# Patient Record
Sex: MALE | Race: WHITE | URBAN - METROPOLITAN AREA
[De-identification: names, ages, dates, MRNs, and addresses within clinical notes are randomized per-mention and may not be internally consistent; named-entity substitution may affect disease eponyms.]

---

## 2017-02-25 ENCOUNTER — HOSPITAL ENCOUNTER (EMERGENCY)
Facility: CLINIC | Age: 42
Discharge: HOME | End: 2017-02-25
Attending: FAMILY MEDICINE | Admitting: FAMILY MEDICINE
Payer: COMMERCIAL

## 2017-02-25 ENCOUNTER — APPOINTMENT (OUTPATIENT)
Dept: GENERAL RADIOLOGY | Facility: CLINIC | Age: 42
End: 2017-02-25
Attending: FAMILY MEDICINE
Payer: COMMERCIAL

## 2017-02-25 ENCOUNTER — TELEPHONE (OUTPATIENT)
Dept: OTHER | Facility: CLINIC | Age: 42
End: 2017-02-25

## 2017-02-25 VITALS
WEIGHT: 160 LBS | RESPIRATION RATE: 16 BRPM | OXYGEN SATURATION: 98 % | TEMPERATURE: 97.8 F | SYSTOLIC BLOOD PRESSURE: 133 MMHG | DIASTOLIC BLOOD PRESSURE: 98 MMHG | HEIGHT: 68 IN | HEART RATE: 92 BPM | BODY MASS INDEX: 24.25 KG/M2

## 2017-02-25 DIAGNOSIS — M02.39 REACTIVE ARTHRITIS OF MULTIPLE SITES (H): ICD-10-CM

## 2017-02-25 DIAGNOSIS — M02.30 REACTIVE INFLAMMATORY ARTHRITIS (H): ICD-10-CM

## 2017-02-25 LAB
ALBUMIN SERPL-MCNC: 3.7 G/DL (ref 3.4–5)
ALBUMIN UR-MCNC: NEGATIVE MG/DL
ALP SERPL-CCNC: 106 U/L (ref 40–150)
ALT SERPL W P-5'-P-CCNC: 22 U/L (ref 0–70)
ANION GAP SERPL CALCULATED.3IONS-SCNC: 10 MMOL/L (ref 3–14)
APPEARANCE UR: CLEAR
AST SERPL W P-5'-P-CCNC: 12 U/L (ref 0–45)
BASOPHILS # BLD AUTO: 0.1 10E9/L (ref 0–0.2)
BASOPHILS NFR BLD AUTO: 0.9 %
BILIRUB SERPL-MCNC: 0.5 MG/DL (ref 0.2–1.3)
BILIRUB UR QL STRIP: NEGATIVE
BUN SERPL-MCNC: 8 MG/DL (ref 7–30)
CALCIUM SERPL-MCNC: 9.3 MG/DL (ref 8.5–10.1)
CHLORIDE SERPL-SCNC: 106 MMOL/L (ref 94–109)
CO2 SERPL-SCNC: 24 MMOL/L (ref 20–32)
COLOR UR AUTO: NORMAL
CREAT SERPL-MCNC: 0.86 MG/DL (ref 0.66–1.25)
CRP SERPL-MCNC: 73 MG/L (ref 0–8)
DEPRECATED S PYO AG THROAT QL EIA: NORMAL
DIFFERENTIAL METHOD BLD: ABNORMAL
EOSINOPHIL # BLD AUTO: 0.1 10E9/L (ref 0–0.7)
EOSINOPHIL NFR BLD AUTO: 0.9 %
ERYTHROCYTE [DISTWIDTH] IN BLOOD BY AUTOMATED COUNT: 13.7 % (ref 10–15)
ERYTHROCYTE [SEDIMENTATION RATE] IN BLOOD BY WESTERGREN METHOD: 22 MM/H (ref 0–15)
FLUAV+FLUBV AG SPEC QL: NEGATIVE
FLUAV+FLUBV AG SPEC QL: NORMAL
GFR SERPL CREATININE-BSD FRML MDRD: ABNORMAL ML/MIN/1.7M2
GLUCOSE SERPL-MCNC: 112 MG/DL (ref 70–99)
GLUCOSE UR STRIP-MCNC: NEGATIVE MG/DL
HCT VFR BLD AUTO: 43.6 % (ref 40–53)
HGB BLD-MCNC: 14.8 G/DL (ref 13.3–17.7)
HGB UR QL STRIP: NEGATIVE
KETONES UR STRIP-MCNC: NEGATIVE MG/DL
LEUKOCYTE ESTERASE UR QL STRIP: NEGATIVE
LYMPHOCYTES # BLD AUTO: 2.8 10E9/L (ref 0.8–5.3)
LYMPHOCYTES NFR BLD AUTO: 23 %
MCH RBC QN AUTO: 29.4 PG (ref 26.5–33)
MCHC RBC AUTO-ENTMCNC: 33.9 G/DL (ref 31.5–36.5)
MCV RBC AUTO: 87 FL (ref 78–100)
MICRO REPORT STATUS: NORMAL
MONOCYTES # BLD AUTO: 0.9 10E9/L (ref 0–1.3)
MONOCYTES NFR BLD AUTO: 7.1 %
NEUTROPHILS # BLD AUTO: 8.4 10E9/L (ref 1.6–8.3)
NEUTROPHILS NFR BLD AUTO: 68.1 %
NITRATE UR QL: NEGATIVE
PH UR STRIP: 5.5 PH (ref 5–7)
PLATELET # BLD AUTO: 363 10E9/L (ref 150–450)
PLATELET # BLD EST: ABNORMAL 10*3/UL
POTASSIUM SERPL-SCNC: 3.6 MMOL/L (ref 3.4–5.3)
PROT SERPL-MCNC: 8.1 G/DL (ref 6.8–8.8)
RBC # BLD AUTO: 5.03 10E12/L (ref 4.4–5.9)
RBC MORPH BLD: NORMAL
SODIUM SERPL-SCNC: 140 MMOL/L (ref 133–144)
SP GR UR STRIP: 1.01 (ref 1–1.03)
SPECIMEN SOURCE: NORMAL
SPECIMEN SOURCE: NORMAL
URN SPEC COLLECT METH UR: NORMAL
UROBILINOGEN UR STRIP-MCNC: NORMAL MG/DL (ref 0–2)
WBC # BLD AUTO: 12.3 10E9/L (ref 4–11)

## 2017-02-25 PROCEDURE — 80053 COMPREHEN METABOLIC PANEL: CPT | Performed by: FAMILY MEDICINE

## 2017-02-25 PROCEDURE — 99285 EMERGENCY DEPT VISIT HI MDM: CPT | Mod: Z6 | Performed by: FAMILY MEDICINE

## 2017-02-25 PROCEDURE — 87081 CULTURE SCREEN ONLY: CPT | Performed by: FAMILY MEDICINE

## 2017-02-25 PROCEDURE — 25000128 H RX IP 250 OP 636: Performed by: FAMILY MEDICINE

## 2017-02-25 PROCEDURE — 25000132 ZZH RX MED GY IP 250 OP 250 PS 637: Performed by: FAMILY MEDICINE

## 2017-02-25 PROCEDURE — 87040 BLOOD CULTURE FOR BACTERIA: CPT | Performed by: FAMILY MEDICINE

## 2017-02-25 PROCEDURE — 87536 HIV-1 QUANT&REVRSE TRNSCRPJ: CPT | Performed by: FAMILY MEDICINE

## 2017-02-25 PROCEDURE — 99285 EMERGENCY DEPT VISIT HI MDM: CPT | Mod: 25

## 2017-02-25 PROCEDURE — 86780 TREPONEMA PALLIDUM: CPT | Mod: 91 | Performed by: FAMILY MEDICINE

## 2017-02-25 PROCEDURE — 87880 STREP A ASSAY W/OPTIC: CPT | Performed by: FAMILY MEDICINE

## 2017-02-25 PROCEDURE — 85652 RBC SED RATE AUTOMATED: CPT | Performed by: FAMILY MEDICINE

## 2017-02-25 PROCEDURE — 87591 N.GONORRHOEAE DNA AMP PROB: CPT | Performed by: FAMILY MEDICINE

## 2017-02-25 PROCEDURE — 86140 C-REACTIVE PROTEIN: CPT | Performed by: FAMILY MEDICINE

## 2017-02-25 PROCEDURE — 96365 THER/PROPH/DIAG IV INF INIT: CPT

## 2017-02-25 PROCEDURE — 96375 TX/PRO/DX INJ NEW DRUG ADDON: CPT

## 2017-02-25 PROCEDURE — 86200 CCP ANTIBODY: CPT | Performed by: FAMILY MEDICINE

## 2017-02-25 PROCEDURE — 96361 HYDRATE IV INFUSION ADD-ON: CPT | Mod: 59

## 2017-02-25 PROCEDURE — 86592 SYPHILIS TEST NON-TREP QUAL: CPT | Performed by: FAMILY MEDICINE

## 2017-02-25 PROCEDURE — 87804 INFLUENZA ASSAY W/OPTIC: CPT | Performed by: FAMILY MEDICINE

## 2017-02-25 PROCEDURE — 81003 URINALYSIS AUTO W/O SCOPE: CPT | Performed by: FAMILY MEDICINE

## 2017-02-25 PROCEDURE — 86431 RHEUMATOID FACTOR QUANT: CPT | Performed by: FAMILY MEDICINE

## 2017-02-25 PROCEDURE — 87491 CHLMYD TRACH DNA AMP PROBE: CPT | Performed by: FAMILY MEDICINE

## 2017-02-25 PROCEDURE — 73560 X-RAY EXAM OF KNEE 1 OR 2: CPT | Mod: RT

## 2017-02-25 PROCEDURE — 87389 HIV-1 AG W/HIV-1&-2 AB AG IA: CPT | Performed by: FAMILY MEDICINE

## 2017-02-25 PROCEDURE — 71010 XR CHEST PORT 1 VW: CPT

## 2017-02-25 PROCEDURE — 86780 TREPONEMA PALLIDUM: CPT | Performed by: FAMILY MEDICINE

## 2017-02-25 PROCEDURE — 85025 COMPLETE CBC W/AUTO DIFF WBC: CPT | Performed by: FAMILY MEDICINE

## 2017-02-25 RX ORDER — LIDOCAINE 40 MG/G
CREAM TOPICAL
Status: DISCONTINUED | OUTPATIENT
Start: 2017-02-25 | End: 2017-02-25 | Stop reason: HOSPADM

## 2017-02-25 RX ORDER — SODIUM CHLORIDE 9 MG/ML
1000 INJECTION, SOLUTION INTRAVENOUS CONTINUOUS
Status: DISCONTINUED | OUTPATIENT
Start: 2017-02-25 | End: 2017-02-25 | Stop reason: HOSPADM

## 2017-02-25 RX ORDER — PREDNISONE 5 MG/1
TABLET ORAL
Qty: 50 TABLET | Refills: 0 | Status: SHIPPED | OUTPATIENT
Start: 2017-02-25 | End: 2017-03-13

## 2017-02-25 RX ORDER — CEFTRIAXONE 500 MG/1
500 INJECTION, POWDER, FOR SOLUTION INTRAMUSCULAR; INTRAVENOUS ONCE
Status: COMPLETED | OUTPATIENT
Start: 2017-02-25 | End: 2017-02-25

## 2017-02-25 RX ORDER — METHYLPREDNISOLONE SODIUM SUCCINATE 125 MG/2ML
40 INJECTION, POWDER, LYOPHILIZED, FOR SOLUTION INTRAMUSCULAR; INTRAVENOUS ONCE
Status: COMPLETED | OUTPATIENT
Start: 2017-02-25 | End: 2017-02-25

## 2017-02-25 RX ORDER — KETOROLAC TROMETHAMINE 30 MG/ML
15 INJECTION, SOLUTION INTRAMUSCULAR; INTRAVENOUS ONCE
Status: COMPLETED | OUTPATIENT
Start: 2017-02-25 | End: 2017-02-25

## 2017-02-25 RX ORDER — HYDROMORPHONE HYDROCHLORIDE 1 MG/ML
0.5 INJECTION, SOLUTION INTRAMUSCULAR; INTRAVENOUS; SUBCUTANEOUS ONCE
Status: COMPLETED | OUTPATIENT
Start: 2017-02-25 | End: 2017-02-25

## 2017-02-25 RX ORDER — AZITHROMYCIN 250 MG/1
1000 TABLET, FILM COATED ORAL ONCE
Status: COMPLETED | OUTPATIENT
Start: 2017-02-25 | End: 2017-02-25

## 2017-02-25 RX ORDER — NAPROXEN 500 MG/1
250 TABLET ORAL 2 TIMES DAILY WITH MEALS
Qty: 20 TABLET | Refills: 0 | Status: SHIPPED | OUTPATIENT
Start: 2017-02-25 | End: 2017-03-05

## 2017-02-25 RX ORDER — OXYCODONE HYDROCHLORIDE 5 MG/1
5-10 TABLET ORAL EVERY 6 HOURS PRN
Qty: 20 TABLET | Refills: 0 | Status: SHIPPED | OUTPATIENT
Start: 2017-02-25

## 2017-02-25 RX ADMIN — SODIUM CHLORIDE 1000 ML: 9 INJECTION, SOLUTION INTRAVENOUS at 11:49

## 2017-02-25 RX ADMIN — METHYLPREDNISOLONE SODIUM SUCCINATE 37.5 MG: 125 INJECTION, POWDER, LYOPHILIZED, FOR SOLUTION INTRAMUSCULAR; INTRAVENOUS at 17:26

## 2017-02-25 RX ADMIN — CEFTRIAXONE SODIUM 500 MG: 500 INJECTION, POWDER, FOR SOLUTION INTRAMUSCULAR; INTRAVENOUS at 18:53

## 2017-02-25 RX ADMIN — KETOROLAC TROMETHAMINE 15 MG: 30 INJECTION, SOLUTION INTRAMUSCULAR at 15:27

## 2017-02-25 RX ADMIN — HYDROMORPHONE HYDROCHLORIDE 0.5 MG: 10 INJECTION, SOLUTION INTRAMUSCULAR; INTRAVENOUS; SUBCUTANEOUS at 12:26

## 2017-02-25 RX ADMIN — AZITHROMYCIN 1000 MG: 250 TABLET, FILM COATED ORAL at 18:38

## 2017-02-25 ASSESSMENT — ENCOUNTER SYMPTOMS
ABDOMINAL PAIN: 0
ARTHRALGIAS: 1
SHORTNESS OF BREATH: 0
JOINT SWELLING: 1

## 2017-02-25 NOTE — ED PROVIDER NOTES
History     Chief Complaint   Patient presents with     Joint Swelling     rt knee, left wrist, 2nd toe left foot     Fever     HPI  Edgar Simms is a 41 year old male who presents for evaluation of joint swelling and fever. The patient reports that he recently spent 2 weeks in [place], returning 2-3 weeks ago. He states that, towards the end of his visit there, he began to have cold symptoms including intermittent fevers and chest congestion. He states that this progressed into GI tract upset, with 2 days of bloody diarrhea. He states that his congestion did resolve, and his bowel movements have largely returned to normal as well.  The patient then noticed last week developing right eye pink eye/conjunctivitis seen by M.D.  The patient was otherwise in his normal state of health until 2 days ago when he began to have swelling and soreness of his left wrist, right knee, and right 2nd toe, which has persisted. He states that he did have a bout fever yesterday as well. The patient reports that he did have an episode of dysuria last week that has since resolved. He was worked up by his PCP, and he reports that UTI/STI were ruled out. The patient also reports an episode of rash on his left shoulder while in [place], but this resolved with topical cortisone cream without recurrence.  As noted patient is a homosexual male who follows up routinely with primary physician return months.  Patient is on prophylactic Truvada.  Patient states that when he [place] had relationships with a couple people he did not know.  Somewhat concerned also wanted to be prophylactically treated against STI's this is a potential cause.    The patient denies a history of arthritis, and no family history of such. The patient did not receive an influenza shot this year. Besides [place], the patient has had no recent travel.    I have reviewed the Medications, Allergies, Past Medical and Surgical History, and Social History in the Epic  system.    No current facility-administered medications for this encounter.      Current Outpatient Prescriptions   Medication     ranitidine (ZANTAC) 150 MG tablet     predniSONE (DELTASONE) 5 MG tablet     oxyCODONE (ROXICODONE) 5 MG IR tablet     naproxen (NAPROSYN) 500 MG tablet     History reviewed. No pertinent past medical history.    History reviewed. No pertinent past surgical history.    No family history on file.    Social History   Substance Use Topics     Smoking status: Never Smoker     Smokeless tobacco: Not on file     Alcohol use Yes        Allergies   Allergen Reactions     Amoxicillin GI Disturbance     Review of Systems   Constitutional: Positive for activity change (difficulty ambulating because of right knee pain) and fever (reported). Negative for appetite change and chills.   HENT: Negative for congestion, sinus pressure, sore throat, trouble swallowing and voice change.    Eyes: Positive for discharge (mild) and redness (right). Negative for photophobia, pain and visual disturbance.   Respiratory: Negative for cough (none currently), chest tightness and shortness of breath.    Cardiovascular: Negative for chest pain and palpitations.   Gastrointestinal: Negative for abdominal pain, nausea and vomiting. Diarrhea: resolved.   Endocrine: Negative for polyuria.   Genitourinary: Negative for difficulty urinating, discharge, flank pain, frequency, genital sores, hematuria, penile pain, penile swelling, scrotal swelling and urgency. Dysuria: history not current no discharge.   Musculoskeletal: Positive for arthralgias (left wrist, right knee, right 2nd toe) and joint swelling (left wrist, right knee, right 2nd toe). Negative for neck pain and neck stiffness.   Skin: Negative for color change. Rash: resolved history.   Allergic/Immunologic: Negative for immunocompromised state.   Neurological: Positive for weakness (Gen.). Negative for seizures, speech difficulty, numbness and headaches.  "  Psychiatric/Behavioral: Positive for decreased concentration and dysphoric mood. Negative for confusion and hallucinations.   All other systems reviewed and are negative.      Physical Exam   BP: (!) 139/99  Pulse: 92  Temp: 97.8  F (36.6  C)  Resp: 12  Height: 172.7 cm (5' 8\")  Weight: 72.6 kg (160 lb)  SpO2: 99 %  Physical Exam   Constitutional: He is oriented to person, place, and time. He appears well-developed and well-nourished. He appears distressed.   Patient pleasant here is nontoxic is uncomfortable   HENT:   Head: Normocephalic and atraumatic.   Eyes: Pupils are equal, round, and reactive to light. No scleral icterus.   Right conjunctival injection without swelling or other abnormalities   Neck: Normal range of motion. Neck supple. No JVD present.   Cardiovascular: Normal rate and regular rhythm.    Pulmonary/Chest: Breath sounds normal. No stridor. No respiratory distress. He has no wheezes. He has no rales.   Abdominal: He exhibits no distension and no mass. There is no tenderness. There is no rebound and no guarding.   Genitourinary: Rectum normal. No penile tenderness.   Musculoskeletal: He exhibits edema and tenderness.   Patient has mild swelling of left wrist along with right knee and reported 2nd left toe.  There is no erythema with this.  Minimal warmth range most some limited because of arthralgias.   Lymphadenopathy:     He has no cervical adenopathy.   Neurological: He is alert and oriented to person, place, and time. He has normal reflexes. No cranial nerve deficit. Coordination normal.   Skin: Skin is warm and dry. No rash noted. He is not diaphoretic. No erythema. No pallor.   No rash seen   Psychiatric:   Appropriate mildly uncomfortable   Nursing note and vitals reviewed.      ED Course     ED Course     10:47 AM  The patient was seen and examined by Eliseo Mathew MD, in Room 22.     Procedures         Patient evaluated here.  I did discuss with infectious disease along with rheumatology " via phone consultation.  In ER we did establish an IV liter normal saline given.  Dilaudid IV titrated for pain with some relief.  Labs were ordered and reviewed.  Rapid flu and rapid strep were negative.  Urinalysis negative for any infectious etiology.  A urine probe for GC chlamydia were sent off  Also with nurse present we did do an oral swab and also a rectal swab for GC chlamydia.  Other laboratory testing CBC was done white count 12.3 hemoglobin 14.8 platelets 363.  CRP 73 sed rate 22  Sodium 140 potassium 2.6 0.86.  Blood culture sent also.  Liver function tests normal.  As noted patient so uncomfortable I did give a dose of Toradol 15 mg IV after reviewing renal functions normal.  No significant relief with this.  Discussed warning length with rheumatology recommended other testings including rheumatoid factor CCP.  We also did HIV along with anti-Treponema  I also ordered an HLA-B27    Discussed with rheumatology.  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  Patient was concerned about potential STI exposure therefore did give him 500 mg Rocephin IV along with this a gram of Zithromax orally.  Recommendations treating with prednisone also I did give 40 mg of Solu-Medrol IV here in the ER.  Patient feeling comfortable going home.  Friend is also present here in the ER.  Patient did get prescriptions filled for discharge  X-ray of right knee was negative for any obvious abnormalities.    Patient is comfortable going home continue prednisone taper over the next few weeks and rheumatology will call for follow-up appointment also prescribed oxycodone and Naprosyn and Zantac patient to return if any concerns agrees plan discharge.    Critical Care time:  none               Labs Ordered and Resulted from Time of ED Arrival Up to the Time of Departure from the ED   CBC WITH PLATELETS DIFFERENTIAL - Abnormal; Notable for the following:        Result Value     WBC 12.3 (*)     Absolute Neutrophil 8.4 (*)     All other components within normal limits   CRP INFLAMMATION - Abnormal; Notable for the following:     CRP Inflammation 73.0 (*)     All other components within normal limits   ERYTHROCYTE SEDIMENTATION RATE AUTO - Abnormal; Notable for the following:     Sed Rate 22 (*)     All other components within normal limits   COMPREHENSIVE METABOLIC PANEL - Abnormal; Notable for the following:     Glucose 112 (*)     All other components within normal limits   UA MACROSCOPIC WITH REFLEX TO MICRO AND CULTURE   HIV ANTIGEN ANTIBODY COMBO   HIV-1 RNA QUANTITATIVE   ANTI TREPONEMA   CYCLIC CITRULLINATED PEPTIDE ANTIBODY IGG   RHEUMATOID FACTOR   PERIPHERAL IV CATHETER   CHLAMYDIA TRACHOMATIS PCR   NEISSERIA GONORRHOEAE PCR   RAPID STREP SCREEN   INFLUENZA A/B ANTIGEN   NEISSERIA GONORRHOEAE PCR   CHLAMYDIA TRACHOMATIS PCR   BETA STREP GROUP A CULTURE   CHLAMYDIA TRACHOMATIS PCR   NEISSERIA GONORRHOEAE PCR     Results for orders placed or performed during the hospital encounter of 02/25/17   XR Chest Port 1 View    Narrative    Single view AP chest    HISTORY: Cough and fever joint pain    COMPARISON STUDY: None    FINDINGS: Lungs and costophrenic angles are clear. Cardiac silhouette  and mediastinal structures are unremarkable.      Impression    IMPRESSION: Clear chest    SURINDER KING MD   Knee XR, 1-2 views, right    Narrative    Exam: XR KNEE RT 1 /2 VW, 2/25/2017 6:21 PM    Indication: pain and swelling    Comparison: None available    Findings:   Nonweightbearing AP and lateral views of the right knee were obtained.  Normal osseous alignment. Moderate knee joint effusion. No acute  osseous abnormality.      Impression    Impression: Moderate knee joint effusion without fracture.    I have personally reviewed the examination and initial interpretation  and I agree with the findings.    ANDERS WILKINS MD   CBC with platelets differential   Result Value Ref Range     WBC 12.3 (H) 4.0 - 11.0 10e9/L    RBC Count 5.03 4.4 - 5.9 10e12/L    Hemoglobin 14.8 13.3 - 17.7 g/dL    Hematocrit 43.6 40.0 - 53.0 %    MCV 87 78 - 100 fl    MCH 29.4 26.5 - 33.0 pg    MCHC 33.9 31.5 - 36.5 g/dL    RDW 13.7 10.0 - 15.0 %    Platelet Count 363 150 - 450 10e9/L    Diff Method Manual Differential     % Neutrophils 68.1 %    % Lymphocytes 23.0 %    % Monocytes 7.1 %    % Eosinophils 0.9 %    % Basophils 0.9 %    Absolute Neutrophil 8.4 (H) 1.6 - 8.3 10e9/L    Absolute Lymphocytes 2.8 0.8 - 5.3 10e9/L    Absolute Monocytes 0.9 0.0 - 1.3 10e9/L    Absolute Eosinophils 0.1 0.0 - 0.7 10e9/L    Absolute Basophils 0.1 0.0 - 0.2 10e9/L    RBC Morphology Normal     Platelet Estimate Confirming automated cell count    CRP inflammation   Result Value Ref Range    CRP Inflammation 73.0 (H) 0.0 - 8.0 mg/L   Erythrocyte sedimentation rate auto   Result Value Ref Range    Sed Rate 22 (H) 0 - 15 mm/h   Comprehensive metabolic panel   Result Value Ref Range    Sodium 140 133 - 144 mmol/L    Potassium 3.6 3.4 - 5.3 mmol/L    Chloride 106 94 - 109 mmol/L    Carbon Dioxide 24 20 - 32 mmol/L    Anion Gap 10 3 - 14 mmol/L    Glucose 112 (H) 70 - 99 mg/dL    Urea Nitrogen 8 7 - 30 mg/dL    Creatinine 0.86 0.66 - 1.25 mg/dL    GFR Estimate >90  Non  GFR Calc   >60 mL/min/1.7m2    GFR Estimate If Black >90   GFR Calc   >60 mL/min/1.7m2    Calcium 9.3 8.5 - 10.1 mg/dL    Bilirubin Total 0.5 0.2 - 1.3 mg/dL    Albumin 3.7 3.4 - 5.0 g/dL    Protein Total 8.1 6.8 - 8.8 g/dL    Alkaline Phosphatase 106 40 - 150 U/L    ALT 22 0 - 70 U/L    AST 12 0 - 45 U/L   UA reflex to Microscopic and Culture   Result Value Ref Range    Color Urine Light Yellow     Appearance Urine Clear     Glucose Urine Negative NEG mg/dL    Bilirubin Urine Negative NEG    Ketones Urine Negative NEG mg/dL    Specific Gravity Urine 1.010 1.003 - 1.035    Blood Urine Negative NEG    pH Urine 5.5 5.0 - 7.0 pH    Protein  Albumin Urine Negative NEG mg/dL    Urobilinogen mg/dL Normal 0.0 - 2.0 mg/dL    Nitrite Urine Negative NEG    Leukocyte Esterase Urine Negative NEG    Source Midstream Urine    Blood culture   Result Value Ref Range    Specimen Description Right Arm     Culture Micro No growth after 17 hours     Micro Report Status Pending    Rapid strep screen   Result Value Ref Range    Specimen Description Throat     Rapid Strep A Screen       NEGATIVE: No Group A streptococcal antigen detected by immunoassay, await   culture report.      Micro Report Status FINAL 02/25/2017    Influenza A/B antigen   Result Value Ref Range    Influenza A/B Agn Specimen Swab     Influenza A Negative NEG    Influenza B  NEG     Negative   Test results must be correlated with clinical data. If necessary, results   should be confirmed by a molecular assay or viral culture.           Assessments & Plan (with Medical Decision Making)  41-year-old sharp homosexual male presents to ER with 2 days of oligo polyarthritis.  No previous history.  Patient recently in [place] did have sexual contact with 2 new partners without to giving history known.  Patient had some dysuria that resolved patient had initially febrile illness with cough upper respiratory symptoms then GI symptoms which now resolved a right conjunctivitis noted patient now 2 days of joint swelling and pain right knee left wrist left 2nd toe.  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  Patient evaluated here for STI he is on Truvada prophylactically but no history of HIV.  Laboratory drawing of HIV along with anti-treponemal along with rheumatoid factor cc P were ordered.  Initially did order an HLA-B27 lab later called back after patient left and said they were unable to run this test.  Patient in the ER was treated with initial soluMedrol 40mg iv patient given prophylactic STI treatment with IV Rocephin and Zithromax orally.  Patient feeling somewhat better will follow-up  with rheumatology the next 2 weeks the meantime will be discharged on prednisone taper along with oxycodone and Naprosyn for pain Zantac to protect stomach monitoring symptoms return if any concerns.           I have reviewed the nursing notes.    I have reviewed the findings, diagnosis, plan and need for follow up with the patient.    Discharge Medication List as of 2/25/2017  7:35 PM      START taking these medications    Details   ranitidine (ZANTAC) 150 MG tablet Take 1 tablet (150 mg) by mouth 2 times daily, Disp-60 tablet, R-0, Local Print      predniSONE (DELTASONE) 5 MG tablet 20mg daily for 5 days, then 15mg daily for 5 days, then 10 mg daily., Disp-50 tablet, R-0, Local Print      oxyCODONE (ROXICODONE) 5 MG IR tablet Take 1-2 tablets (5-10 mg) by mouth every 6 hours as needed for pain maximum 6 tablet(s) per day, Disp-20 tablet, R-0, Local Print      naproxen (NAPROSYN) 500 MG tablet Take 0.5 tablets (250 mg) by mouth 2 times daily (with meals) for 8 days, Disp-20 tablet, R-0, Local Print             Final diagnoses:   Reactive inflammatory arthritis (H)     I, Josué Saenz, am serving as a trained medical scribe to document services personally performed by Eliseo Mathew MD, based on the provider's statements to me.      IEliseo MD, was physically present and have reviewed and verified the accuracy of this note documented by Josué Saenz.    2/25/2017   [facility], EMERGENCY DEPARTMENT    This note was created at least in part by the use of dragon voice dictation system. Inadvertent typographical errors may still exist.  Eliseo Mathew MD.         Eliseo Mathew MD  02/26/17 0734

## 2017-02-25 NOTE — ED AVS SNAPSHOT
[facility], Emergency Department    [address] [place] [address]    Phone:  [phone]                                       Edgar Simms   MRN: [id number]    Department:  [facility], Emergency Department   Date of Visit:  2/25/2017           Patient Information     Date Of Birth          1975        Your diagnoses for this visit were:     Reactive inflammatory arthritis (H)        You were seen by Eliseo Mathew MD.      Follow-up Information     Follow up with Tee Duque MD.    Specialty:  Internal Medicine    Contact information:    [facility]  [address] [address] [phone]          Discharge Instructions       Home.  Most likely a post infectious arthritis is what you are experiencing.  Home with ice to joints.  Take the prednisone as directed to decrease inflammation.  Naproxen for pain.  Tylenol as needed.  Oxycodone for pain.  Zantac to protect stomach from medication irritation.  [facility] in the [facility] 2 weeks. They should call you.  Return if worse concerns.    Please make an appointment to follow up with [facility]  [phone] if you don't hear from them.        24 Hour Appointment Hotline       To make an appointment at any [facility], call [phone] ([phone]). If you don't have a family doctor or clinic, we will help you find one. [facility] clinics are conveniently located to serve the needs of you and your family.             Review of your medicines      START taking        Dose / Directions Last dose taken    naproxen 500 MG tablet   Commonly known as:  NAPROSYN   Dose:  250 mg   Quantity:  20 tablet        Take 0.5 tablets (250 mg) by mouth 2 times daily (with meals) for 8 days   Refills:  0        oxyCODONE 5 MG IR tablet   Commonly known as:  ROXICODONE   Dose:  5-10 mg   Quantity:  20 tablet        Take 1-2 tablets (5-10 mg) by mouth every 6 hours as needed for pain maximum 6 tablet(s)  per day   Refills:  0        predniSONE 5 MG tablet   Commonly known as:  DELTASONE   Quantity:  50 tablet        20mg daily for 5 days, then 15mg daily for 5 days, then 10 mg daily.   Refills:  0        ranitidine 150 MG tablet   Commonly known as:  ZANTAC   Dose:  150 mg   Quantity:  60 tablet        Take 1 tablet (150 mg) by mouth 2 times daily   Refills:  0                Prescriptions were sent or printed at these locations (4 Prescriptions)                   Other Prescriptions                Printed at Department/Unit printer (4 of 4)         ranitidine (ZANTAC) 150 MG tablet               predniSONE (DELTASONE) 5 MG tablet               oxyCODONE (ROXICODONE) 5 MG IR tablet               naproxen (NAPROSYN) 500 MG tablet                Procedures and tests performed during your visit     Procedure/Test Number of Times Performed    Anti Treponema 1    Beta strep group A culture 1    Blood culture 1    CBC with platelets differential 1    CRP inflammation 1    Chlamydia trachomatis PCR 3    Comprehensive metabolic panel 1    Cyclic Citrullinated Peptide Antibody IgG 1    Erythrocyte sedimentation rate auto 1    HIV Antigen Antibody Combo 1    HIV-1 RNA quantitative 1    Influenza A/B antigen 1    Knee XR, 1-2 views, right 1    Neisseria gonorrhoea PCR 1    Neisseria gonorrhoeae PCR 2    Peripheral IV catheter 1    Rapid strep screen 1    Rheumatoid factor 1    UA reflex to Microscopic and Culture 1    XR Chest Port 1 View 1      Orders Needing Specimen Collection     None      Pending Results     Date and Time Order Name Status Description    2/25/2017 1716 Knee XR, 1-2 views, right Preliminary     2/25/2017 1332 Neisseria gonorrhoeae PCR In process     2/25/2017 1332 Chlamydia trachomatis PCR In process     2/25/2017 1202 Chlamydia trachomatis PCR In process     2/25/2017 1202 Neisseria gonorrhoeae PCR In process     2/25/2017 1110 Anti Treponema In process     2/25/2017 1110 HIV-1 RNA quantitative In process  "    2017 1110 HIV Antigen Antibody Combo In process     2017 1110 Blood culture Preliminary     2017 1110 Neisseria gonorrhoea PCR In process     2017 1110 Chlamydia trachomatis PCR In process     2017 1110 Beta strep group A culture In process     2017 1105 Rheumatoid factor In process     2017 1105 Cyclic Citrullinated Peptide Antibody IgG In process             Pending Culture Results     Date and Time Order Name Status Description    2017 1332 Neisseria gonorrhoeae PCR In process     2017 1332 Chlamydia trachomatis PCR In process     2017 1202 Chlamydia trachomatis PCR In process     2017 1202 Neisseria gonorrhoeae PCR In process     2017 1110 Blood culture Preliminary     2017 1110 Neisseria gonorrhoea PCR In process     2017 1110 Chlamydia trachomatis PCR In process     2017 1110 Beta strep group A culture In process             Thank you for choosing [facility]       Thank you for choosing [facility] for your care. Our goal is always to provide you with excellent care. Hearing back from our patients is one way we can continue to improve our services. Please take a few minutes to complete the written survey that you may receive in the mail after you visit with us. Thank you!        [employer] Information     [facility] lets you send messages to your doctor, view your test results, renew your prescriptions, schedule appointments and more. To sign up, go to [email or web address] . Click on \"Log in\" on the left side of the screen, which will take you to the Welcome page. Then click on \"Sign up Now\" on the right side of the page.     You will be asked to enter the access code listed below, as well as some personal information. Please follow the directions to create your username and password.     Your access code is: [email or web address]  Expires: 2017  5:49 PM     Your access code will  in 90 days. If you need help or a new code, please call " your [facility] clinic or [phone].        Care EveryWhere ID     This is your Care EveryWhere ID. This could be used by other organizations to access your [facility] medical records  [id number]        After Visit Summary       This is your record. Keep this with you and show to your community pharmacist(s) and doctor(s) at your next visit.

## 2017-02-25 NOTE — ED AVS SNAPSHOT
[facility], [place], Emergency Department    [address] [place] [address]    Phone:  [phone]                                       Edgar Simms   MRN: [id number]    Department:  [facility], Emergency Department   Date of Visit:  2/25/2017           After Visit Summary Signature Page     I have received my discharge instructions, and my questions have been answered. I have discussed any challenges I see with this plan with the nurse or doctor.    ..........................................................................................................................................  Patient/Patient Representative Signature      ..........................................................................................................................................  Patient Representative Print Name and Relationship to Patient    ..................................................               ................................................  Date                                            Time    ..........................................................................................................................................  Reviewed by Signature/Title    ...................................................              ..............................................  Date                                                            Time

## 2017-02-26 ENCOUNTER — TELEPHONE (OUTPATIENT)
Dept: EMERGENCY MEDICINE | Facility: CLINIC | Age: 42
End: 2017-02-26

## 2017-02-26 LAB
C TRACH DNA SPEC QL NAA+PROBE: NORMAL
C TRACH DNA SPEC QL NAA+PROBE: NORMAL
N GONORRHOEA DNA SPEC QL NAA+PROBE: NORMAL
RPR SER QL: ABNORMAL
SPECIMEN SOURCE: NORMAL
T PALLIDUM IGG+IGM SER QL: ABNORMAL

## 2017-02-26 ASSESSMENT — ENCOUNTER SYMPTOMS
SINUS PRESSURE: 0
FREQUENCY: 0
EYE DISCHARGE: 1
SPEECH DIFFICULTY: 0
DIFFICULTY URINATING: 0
CHILLS: 0
ACTIVITY CHANGE: 1
NECK STIFFNESS: 0
FLANK PAIN: 0
HALLUCINATIONS: 0
NAUSEA: 0
EYE PAIN: 0
PALPITATIONS: 0
SORE THROAT: 0
NUMBNESS: 0
WEAKNESS: 1
VOMITING: 0
HEMATURIA: 0
APPETITE CHANGE: 0
HEADACHES: 0
SEIZURES: 0
NECK PAIN: 0
CHEST TIGHTNESS: 0
FEVER: 1
COLOR CHANGE: 0
CONFUSION: 0
DYSPHORIC MOOD: 1
EYE REDNESS: 1
COUGH: 0
TROUBLE SWALLOWING: 0
PHOTOPHOBIA: 0
DECREASED CONCENTRATION: 1
VOICE CHANGE: 0

## 2017-02-26 NOTE — DISCHARGE INSTRUCTIONS
Home.  Most likely a post infectious arthritis is what you are experiencing.  Home with ice to joints.  Take the prednisone as directed to decrease inflammation.  Naproxen for pain.  Tylenol as needed.  Oxycodone for pain.  Zantac to protect stomach from medication irritation.  [facility] in the next 2 weeks. They should call you.  Return if worse concerns.    Please make an appointment to follow up with [facility]  [phone] if you don't hear from them.

## 2017-02-26 NOTE — TELEPHONE ENCOUNTER
Called by ED provider about Mr. Simms who is presenting with oligo arthritis after having had URI 3 weeks ago, Gastroenteritis 2 weeks ago, and ~ 1 week of conjunctivitis. No eye pain or vision changes and diagnosed conjunctivitis by PCP, not ophtho. Now presenting to the ED with predominantly LE inflammatory arthritis. Patient has ongoing infectious work up including HIV, chlamydia and gonorrhea. Denies current  symptoms. Noted that his CXR, UA, influenza, rapid Strep all negative currently. Multiple (rectal and ) G&C swabs pending.     Given the high likelihood of reactive arthritis despite NSAID therapy combined with more evidence of conjunctivitis than uveitis, recommended Prednisone 20mg x5 days, 15 mg x 5 days, then 10mg until rheumatology clinic f/u in ~ 2-3 weeks. Patient can take occasional NSAIDs, but recommended discussion that NSAIDs and prednisone increase risk of gastritis and gastric ulcers. Also noted that if patient has signs of G&C infection he will need antibiotics, not for GI infection.     I will c/c this to the rheumatology clinic to aid with outpatient f/u within 2-3 weeks.    Patient was discussed with Staff who agrees with the above assessment and plan.    Merlin Borges  Rheumatology Fellow

## 2017-02-26 NOTE — TELEPHONE ENCOUNTER
[facility] Emergency Department Lab result notification [Adult-Male]    [facility] ED lab result protocol used  Syphilis protocol    Reason for call  Notify of lab results, assess symptoms,  review ED providers recommendations/discharge instructions (if necessary) and advise per ED lab result f/u protocol    Lab Result (including Rx patient on, if applicable)  Final result testing for Syphilis (Treponema pallidum antibody, IgG Serum or Anti-Treponema) is POSITIVE   Consult ED provider per [place] ED Lab Result protocol.    Information table from ED Provider visit on 02/25/2017  ED diagnosis: Reactive inflammatory arthritis (H)   ED provider Eliseo Mathew MD   Symptoms reported at ED visit (Chief complaint, HPI) a 41 year old male who presents for evaluation of joint swelling and fever. The patient reports that he recently spent 2 weeks in [place], returning 2-3 weeks ago. He states that, towards the end of his visit there, he began to have cold symptoms including intermittent fevers and chest congestion. He states that this progressed into GI tract upset, with 2 days of bloody diarrhea. He states that his congestion did resolve, and his bowel movements have largely returned to normal as well. The patient then noticed last week developing right eye pink eye/conjunctivitis seen by M.D.  The patient was otherwise in his normal state of health until 2 days ago when he began to have swelling and soreness of his left wrist, right knee, and right 2nd toe, which has persisted. He states that he did have a bout fever yesterday as well. The patient reports that he did have an episode of dysuria last week that has since resolved. He was worked up by his PCP, and he reports that UTI/STI were ruled out. The patient also reports an episode of rash on his left shoulder while in [place], but this resolved with topical cortisone cream without recurrence.  As noted patient is a homosexual male who follows up routinely with primary  physician return months. Patient is on prophylactic Truvada. Patient states that when he [place] had relationships with a couple people he did not know. Somewhat concerned also wanted to be prophylactically treated against STI's this is a potential cause.     The patient denies a history of arthritis, and no family history of such. The patient did not receive an influenza shot this year. Besides [place], the patient has had no recent travel.     I have reviewed the Medications, Allergies, Past Medical and Surgical History, and Social History in the Epic system.   ED providers Impression and Plan (applicable information) 41-year-old sharp homosexual male presents to ER with 2 days of oligo polyarthritis. No previous history. Patient recently in [place] did have sexual contact with 2 new partners without to giving history known. Patient had some dysuria that resolved patient had initially febrile illness with cough upper respiratory symptoms then GI symptoms which now resolved a right conjunctivitis noted patient now 2 days of joint swelling and pain right knee left wrist left 2nd toe. 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 Patient evaluated here for STI he is on Truvada prophylactically but no history of HIV.  Laboratory drawing of HIV along with anti-treponemal along with rheumatoid factor cc P were ordered. Initially did order an HLA-B27 lab later called back after patient left and said they were unable to run this test. Patient in the ER was treated with initial soluMedrol 40mg iv patient given prophylactic STI treatment with IV Rocephin and Zithromax orally. Patient feeling somewhat better will follow-up with rheumatology the next 2 weeks the meantime will be discharged on prednisone taper along with oxycodone and Naprosyn for pain Zantac to protect stomach monitoring symptoms return if any concerns.    Significant Medical hx, if applicable Reviewed   Coumadin/Warfarin [Yes or No] no    Creatinine Level (mg/dl) 0.86   Creatinine clearance (ml/min), if applicable NA   Allergies Amoxicillin   Weight, if applicable 72.6 kg      RN Assessment (Patient s current Symptoms), include time called.  [Insert Left message here if message left]  At 1413 Left voicemail message requesting a call back to [phone] between 10 a.m. and 6:30 p.m. for patient's ED/UC lab results.    [facility] Provider Name & Recommendations (included time consulted)  At 1412 Consulted Marva who deferred to provider pt saw in ED, Dr. Mathew, who recommended pt return to  ED for treatment.       Sheryl Ray, RN  [facility] RN  Lung Nodule and ED Lab Result F/u RN  Epic pool (ED late result f/u RN): [id number]  [facility] INCIDENTAL RADIOLOGY F/U NURSES: P [id number]  Ph# [phone]       Copy of Lab result   Exam Information   Exam Date Exam Time Accession # Results    2/25/17 11:05 AM [id number]    Component Results   Component Value Flag Ref Range Units Status Collected Lab   Treponema pallidum Antibody  (A) NEG  Final 02/25/2017 11:05 AM 51   Positive    The Anti-Treponema Antibody screening tends to remain positive for life,    therefore it does not distinguish between active and past syphilis infections.    All positive and equivocal results will automatically reflex to a non-specific    Rapid Plasma Reagin (RPR) test.    If the Treponema Antibody is positive, and the RPR is positive, this is    presumptive evidence of current infection, inadequately treated infection,    persistent infection or reinfection.    If the Anti-Treponema Antibody is positive and the RPR is negative, then a    second Treponema specific test (TP-PA) will be performed to determine whether    the antibody test is falsely positive or is detecting early infection.  If the    latter is suspected, repeat testing in approximately two weeks is recommended.

## 2017-02-26 NOTE — LETTER
March 2, 2017        Edgar Simms  [address]   [facility] [address]          Dear Edgar Simms:    You were seen in the [facility] at [facility], [place], EMERGENCY DEPARTMENT on 2/25/2017.  We are unable to reach you by phone, so we are sending you this letter.     It is important that you call [facility] [place] [facility] Department Lab Follow-up nurse at [phone] as we have information to share with you about a Lab result from your Emergency Department visit.       Best time to call back is between 10 a.m. and 6 p.m.      Sincerely,           Alex Registered Nurse  [facility] Lab F/u RN  [phone]

## 2017-02-26 NOTE — TELEPHONE ENCOUNTER
At 1511 Dr. Mathew called to add to his recommendation for this patient.    Please inquire if patient has had syphilis before.   If he has had it before wait for the reflex to RPR if that is positive then we will want him to be treated - send pt to ED per previous recommendation   If he has not had syphilis before then we should go ahead and treat - send pt to ED per previous note.      Sheryl Ray, RN  [facility] RN  Lung Nodule and ED Lab Result F/u RN  Epic pool (ED late result f/u RN): P [id number]  [facility] INCIDENTAL RADIOLOGY F/U NURSES: P [id number]  # [phone]

## 2017-02-26 NOTE — ED PROVIDER NOTES
Patient seen by myself yesterday.  Multiple labs done at Center.  Reported today by laboratory that the anti-treponemal test was positive.  Initially contacted the patient just left a message.  Discussed with rheumatology along with infectious disease.  Recommendations this point.  Serologies being ReFlex to an RPR to verify.  With contact patient if he has never had syphilis then would like to treat him empirically now regardless of the RPR.  If the patient has had syphilis before would wait for the RPR to be positive and then treat him empirically with this    As noted discussed with lab nursing follow-up    Eliseo Mathew MD.       Eliseo Mathew MD  02/26/17 [id number]

## 2017-02-26 NOTE — TELEPHONE ENCOUNTER
I have come back positive in the past and my markers will show I have had  And been treated in the past for syphilis.   Sheryl Ray, RN  [facility] RN  Lung Nodule and ED Lab Result F/u RN  Epic pool (ED late result f/u RN): P [id number]  [facility] INCIDENTAL RADIOLOGY F/U NURSES: P [id number]  # [phone]

## 2017-02-27 LAB
BACTERIA SPEC CULT: NORMAL
C TRACH DNA SPEC QL NAA+PROBE: ABNORMAL
HIV 1+2 AB+HIV1 P24 AG SERPL QL IA: NORMAL
MICRO REPORT STATUS: NORMAL
RHEUMATOID FACT SER NEPH-ACNC: <20 IU/ML (ref 0–20)
SPECIMEN SOURCE: ABNORMAL
SPECIMEN SOURCE: NORMAL

## 2017-02-27 NOTE — TELEPHONE ENCOUNTER
Notification of [facility] ED lab result:  Visit date: 02/25/2017  Lab Result:  Final Chlamydia trachomatis PCR on 02/27/2017 is POSITIVE for C. trachomatis rRNA by transcription mediated amplification.  Patient was treated appropriately in the ED [Yes or No]:   yes         If Yes, list what was given in the ED:  azithromycin (ZITHROMAX) tablet 1,000 mg   [facility] ED discharge antibiotic (if prescribed): none  If treated appropriately in the [facility] ED, notify patient of result and STD instructions.  If no treatment initiated in the [facility] ED, treat per [facility] ED Lab Result protocol.    Recommendations/Instructions:  STD Patient Instructions:    We recommend that you contact any recent sexual partners within the last 2 months and have them evaluated by a physician.    Avoid sexual activity for 7 to 10 days or until both your and your partner(s) have completed all antibiotic medications.    We advise that you consider following up with your PCP at approximately 3 months for retesting to be sure the infection has cleared.     Sheryl Ray RN  [facility] RN  Lung Nodule and ED Lab Result F/u RN  Epic pool (ED late result f/u RN): P [id number]  [facility] INCIDENTAL RADIOLOGY F/U NURSES: P [id number]  Ph# [phone]

## 2017-02-27 NOTE — TELEPHONE ENCOUNTER
----- Message from Marcy Egan RN sent at 2/27/2017  3:26 PM CST -----  Regarding: RE: Rheum ED follow up new patient  Pt has an appt with Dr. Guevara on 3/14/17.    Thanks Marcy  ----- Message -----     From: Amee Noble     Sent: 2/27/2017  11:11 AM       To: Eduardo Villalpando RN, Marcy Egan RN  Subject: FW: Rheum ED follow up new patient               Is it ok to schedule this patient?  ----- Message -----     From: Johnathan Contreras RN     Sent: 2/27/2017   8:31 AM       To: Clinic Coordinators-Med[facility]-  Subject: Rheum ED follow up new patient                   Hello Coordinators,  Can you please assist Dr. Borges in scheduling a new patient visit for Edgar Simms.  He will be a new patient, Dx ED follow up for possible reactive arthritis and will need to be seen in 2-3 weeks.  It looks like Dr. Borges does have some new patient slots available in that time frame.    Please call the patient to set up,  Johnathan Larsen

## 2017-02-27 NOTE — TELEPHONE ENCOUNTER
Contacted pt to offer an appointment in Rheumatology. He is not wishing to take the appointment offered tomorrow because he wants to see how he does with the medications he was given. Appointment was offered and accepted with Dr Guevara on Tuesday, 3/14/17 at 11 am. Pt had questions regarding his prednisone, these were answered to the best of my ability. He should be taking it in the am, side effects he might experienced. Also discussed care of the knee-reviewed what was given in the ER. Encouraged to remain as active as he can be as he needs to keep the muscles strong to hold the joints in place, but remember to rest also when needed, apply ice, and take the meds as advised in the ER.    Staff message sent to the pool asking for assistance in scheduling this appointment.

## 2017-02-28 ENCOUNTER — PRE VISIT (OUTPATIENT)
Dept: RHEUMATOLOGY | Facility: CLINIC | Age: 42
End: 2017-02-28

## 2017-02-28 LAB
CCP AB SER IA-ACNC: 2 U/ML
HIV1 RNA # PLAS NAA DL=20: NORMAL {COPIES}/ML
HIV1 RNA SERPL NAA+PROBE-LOG#: NORMAL {LOG_COPIES}/ML
T PALLIDUM AB SER QL AGGL: ABNORMAL

## 2017-02-28 NOTE — TELEPHONE ENCOUNTER
1.  Date/reason for appt: 3/28/17 - Knee Pain/Swelling  2.  Referring provider: ED/Hosp  3.  Call to patient (Yes / No - short description): No- hosp f/u  4.  Previous care at / records requested from: [facility] ED/Hosp -- ED note 2/25/17 in Epic

## 2017-03-02 NOTE — TELEPHONE ENCOUNTER
At 9:10A, [facility] Syphilis Surveillance Coordinator, Nasra, returned call.  She has been following his results through [facility].  ([facility] sends results to [facility]).  RPR is negative. (If negative, lab sends off for testing TP-PA).  TP-PA is positive.   He has been previously treated with Bicillin 4/7/14.  She states that no treatment change is recommended.  No need to contact patient.      Patient has not been notified of the Positive Chlamydia result.  Left voicemail message requesting a call back to [phone] between 10 a.m. and 6:30 p.m. for patient's ED/UC lab results.    Alex Schwartz RN  [facility] [employer] [facility] RN  Lung Nodule and ED Lab Result F/u RN  Epic pool (ED late result f/u RN): P [id number]  [facility] INCIDENTAL RADIOLOGY F/U NURSES: P [id number]  # [phone]

## 2017-03-03 LAB
BACTERIA SPEC CULT: NO GROWTH
MICRO REPORT STATUS: NORMAL
SPECIMEN SOURCE: NORMAL

## 2017-03-07 ENCOUNTER — TELEPHONE (OUTPATIENT)
Dept: RHEUMATOLOGY | Facility: CLINIC | Age: 42
End: 2017-03-07

## 2017-03-07 NOTE — TELEPHONE ENCOUNTER
"New pt called re: ongoing flare symptoms    Location: knees (side alternates everyday); bilateral in ankles and toes; L foot, L wrist. Starting in R hands, R pinky as of PM 3/6/17.     Pain: Constant ache, 9/10 in AM, 3/10 once meds take effect. Movement causes shooting pain.     Joints are inflamed, stiff, slight warmth/slight redness. Temp unknown. Able to bear wt and walk but w/ pain. Having nightsweats.    Symptoms started 2/22/17, prednisone taper began following ED visit, pain got worse when went down from 20 to 15 mg prednisone.Currently taking prednisone, Naproxen in AM, oxycodone throughout day. Meds help w/ pain until evening, feels they wear off. Ice had no effect, Bengay-type product made feel \"almost too warm.\" Also asking if can get med refill, worried will run out of meds. Would like to be seen sooner than 3/28/17. Detailed VM fine. Please advise at [phone]. Sent to rheum pool  "

## 2017-03-08 NOTE — TELEPHONE ENCOUNTER
Returned call to pt. He was to be scheduled with Dr Guevara on 3/14/17, but according to schedule he has an appointment on 3/28/17. Offered appointment on the 14th or one with Dr Coombs on Monday the 13th. He would like to be seen on Monday with Dr Coombs at 9:30 am. Pt has not been seen in Rheumatology yet, he will continue with the meds and instructions he received in the ER.    Staff message sent to the pool asking for assistance in scheduling this appointment.

## 2017-03-13 ENCOUNTER — RESULTS ONLY (OUTPATIENT)
Dept: OTHER | Facility: CLINIC | Age: 42
End: 2017-03-13

## 2017-03-13 ENCOUNTER — OFFICE VISIT (OUTPATIENT)
Dept: RHEUMATOLOGY | Facility: CLINIC | Age: 42
End: 2017-03-13
Attending: INTERNAL MEDICINE
Payer: COMMERCIAL

## 2017-03-13 VITALS
HEIGHT: 68 IN | HEART RATE: 87 BPM | BODY MASS INDEX: 24.55 KG/M2 | DIASTOLIC BLOOD PRESSURE: 89 MMHG | WEIGHT: 162 LBS | SYSTOLIC BLOOD PRESSURE: 132 MMHG | OXYGEN SATURATION: 97 %

## 2017-03-13 DIAGNOSIS — M02.369: ICD-10-CM

## 2017-03-13 DIAGNOSIS — M02.369: Primary | ICD-10-CM

## 2017-03-13 DIAGNOSIS — M19.90 INFLAMMATORY ARTHRITIS: ICD-10-CM

## 2017-03-13 LAB
B BURGDOR IGG+IGM SER QL: 0.1 (ref 0–0.89)
CRP SERPL-MCNC: 79.7 MG/L (ref 0–8)
ERYTHROCYTE [SEDIMENTATION RATE] IN BLOOD BY WESTERGREN METHOD: 40 MM/H (ref 0–15)
HBV CORE AB SERPL QL IA: NONREACTIVE
HBV SURFACE AB SERPL IA-ACNC: 9.81 M[IU]/ML
HBV SURFACE AG SERPL QL IA: NONREACTIVE
HCV AB SERPL QL IA: NORMAL
URATE SERPL-MCNC: 3.5 MG/DL (ref 3.5–7.2)

## 2017-03-13 PROCEDURE — 81374 HLA I TYPING 1 ANTIGEN LR: CPT | Performed by: INTERNAL MEDICINE

## 2017-03-13 PROCEDURE — 86704 HEP B CORE ANTIBODY TOTAL: CPT | Performed by: INTERNAL MEDICINE

## 2017-03-13 PROCEDURE — 85652 RBC SED RATE AUTOMATED: CPT | Performed by: INTERNAL MEDICINE

## 2017-03-13 PROCEDURE — 86803 HEPATITIS C AB TEST: CPT | Performed by: INTERNAL MEDICINE

## 2017-03-13 PROCEDURE — 87340 HEPATITIS B SURFACE AG IA: CPT | Performed by: INTERNAL MEDICINE

## 2017-03-13 PROCEDURE — 86618 LYME DISEASE ANTIBODY: CPT | Performed by: INTERNAL MEDICINE

## 2017-03-13 PROCEDURE — 86140 C-REACTIVE PROTEIN: CPT | Performed by: INTERNAL MEDICINE

## 2017-03-13 PROCEDURE — 84550 ASSAY OF BLOOD/URIC ACID: CPT | Performed by: INTERNAL MEDICINE

## 2017-03-13 PROCEDURE — 86706 HEP B SURFACE ANTIBODY: CPT | Performed by: INTERNAL MEDICINE

## 2017-03-13 PROCEDURE — 36415 COLL VENOUS BLD VENIPUNCTURE: CPT | Performed by: INTERNAL MEDICINE

## 2017-03-13 RX ORDER — EMTRICITABINE AND TENOFOVIR DISOPROXIL FUMARATE 100; 150 MG/1; MG/1
TABLET, FILM COATED ORAL
COMMUNITY
Start: 2016-08-01

## 2017-03-13 RX ORDER — HYDROCODONE BITARTRATE AND ACETAMINOPHEN 7.5; 325 MG/1; MG/1
TABLET ORAL
COMMUNITY
Start: 2017-03-09

## 2017-03-13 RX ORDER — PREDNISONE 5 MG/1
TABLET ORAL
Qty: 100 TABLET | Refills: 1 | Status: SHIPPED
Start: 2017-03-13 | End: 2017-03-13

## 2017-03-13 RX ORDER — TRAMADOL HYDROCHLORIDE 50 MG/1
50 TABLET ORAL EVERY 12 HOURS PRN
Qty: 60 TABLET | Refills: 1 | Status: SHIPPED | OUTPATIENT
Start: 2017-03-13

## 2017-03-13 RX ORDER — PREDNISONE 5 MG/1
TABLET ORAL
Qty: 100 TABLET | Refills: 1 | Status: SHIPPED | OUTPATIENT
Start: 2017-03-13

## 2017-03-13 ASSESSMENT — PAIN SCALES - GENERAL: PAINLEVEL: MODERATE PAIN (4)

## 2017-03-13 NOTE — PATIENT INSTRUCTIONS
You likely have reactive arthritis    Take prednisone course as prescribed     Labs today     Could use tramadol as needed    Return in 4-8 weeks with me

## 2017-03-13 NOTE — NURSING NOTE
"Chief Complaint   Patient presents with     RECHECK     knee pain       Initial /89  Pulse 87  Ht 1.727 m (5' 8\")  Wt 73.5 kg (162 lb)  SpO2 97%  BMI 24.63 kg/m2 Estimated body mass index is 24.63 kg/(m^2) as calculated from the following:    Height as of this encounter: 1.727 m (5' 8\").    Weight as of this encounter: 73.5 kg (162 lb).  Medication Reconciliation: complete    "

## 2017-03-13 NOTE — LETTER
3/13/2017     RE: Edgar Simms  [address]   [facility] [address]     Dear Colleague,    Thank you for referring your patient, Edgar Simms, to the [facility] RHEUMATOLOGY at [facility]. Please see a copy of my visit note below.    [facility] - Rheumatology Clinic Visit     Edgar Simms MRN# [id number]   YOB: 1975    Primary care provider: Tee Duque  Mar 13, 2017          Assessment and Plan:     Problem list:  # reactive arthritis w/ + chlamydia trachomatis rectal swab 2/25/17, polyarticular inflammatory arthritis involving BL knees, L hand/wrist, BL ankle, BL MTPs, L foot 2nd digit (?dactylitis), inflammatory back pain occurring 2 wks after URI//GI illness w/ associated conjunctivitis  # recent chlamydia trochomatis infection, s/p azithromycin 1g (appropriately txd)  # hx syphillis years ago, s/p treatment. 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 Workup in ED on 2/25 showing +chlamydia rectal swab consistent w/ recent chlamydia infection. Despite having prior axial pain/stiffness symptoms and possibly prior dactylitis of toe, differential dx less likely other spondyloarthropathies such as PsA or AS or a crystalline arthropathy, but these have to remain on differential. Also much less likely RA (RF/CCP neg). For this, would suggest some additional workup as listed below.     Most Hilda resolves within 2-5 months, if progresses beyond 6 months, still most commonly resolves by 1 year. Few go on to develop chronic arthritis, RF includes HLAB27 presence. Will send for this today. Treatment typically NSAIDs or steroids. Given degree of inflammation will  "elect steroids with slow taper. May need DMARD (SSZ) in future for steroid-sparing agent if symptoms continue.     Plan:  - prednisone burst/taper - 20 mg x2 wk, 15 mg x2 wk, 10 mg daily until next appt  - would avoid concomitant NSAIDs while on prednisone  - will consider SSZ use in future if needed  - trial tramadol in place of norco/oxycodone  - tests as below    Routine health maintenance:  HBVsAg: today   HBVcore: today   HCV: today   Q gold (or T spot): not checked  Vitamin D and calcium/bone health: recommended starting Ca/Vit D supplement while on prednisone    I will be back in touch with the patient through mychart/letter when results are available.     Return in about 6 weeks (around 4/24/2017) for 4-8 weeks.    Orders Placed This Encounter   Procedures     Uric acid     ESR     CRP inflammation     HLA-B27 Typing     Lyme Disease Patricia with reflex to WB Serum     Hepatitis B surface antigen     Hepatitis B core antibody     Hepatitis B Surface Antibody     Hepatitis C antibody       Patient was seen and discussed with Dr. Singh.     Leslie Coombs MD  Rheumatology Fellow  Pager [phone]    Attending Note: I saw and evaluated the patient with Dr. Coombs. I agree with the assessment and plan.    Nina Singh MD            Active Problem List:   Inflammatory arthritis         History of Present Illness:     Chief Complaint   Patient presents with     RECHECK     knee pain       Patient is a 40 y/o M who presents today for rheumatology referral from Dr. Eliseo Mathew (ED) for possible reactive arthritis.     Pt travelled to [place] for vacation for 2 weeks starting Jan 25th. States toward the end of the trip he developed a cold with URI symptoms, then 2d later developed severe illness with fever (soaking sheets), head congestion, bloody diarrhea. Felt so sick didn't leave the hotel x2 weeks. States was the \"worst cold\" he's ever had. After returning to [place], he was seen by PCP in [facility] clinic where he " "was also diagnosed with \"pink eye\" originally affecting one eye, then the other. Caused some mild eye redness, pain, no significant changes in vision with symptoms resolving within a few days. Noted around this time he developed significant pain/swelling/stiffness in multiple joints, primarily in areas where he believes he's previously injured. Noted pain in R lateral knee pain, L wrist, and L ankle along with L foot 2nd digit swelling all within 24 hours. Noted pain was excruciating and he was unable to get out of bed 2/2 the pain.     He was seen in ED on 2/25 and after discussion with rheumatology and ID on the phone was diagnosed with likely reactive arthritis. Of note the pt does report having several sexual encounters while on vacation. ID workup in ED revealed +chlamydia rectal swab, but oral, urinary chlamydia tests negative. Rest of workup with gonorrhea swabs, UA, CXR was negative. Had elevated ESR/CRP. Pos syphillis test, but RPR was negative, which is consistent with his prior tx for syphillis. Pt was started on prednisone 20 mg x5d, 15 mg x5d, 10 mg x5d. Noted this helped significantly, but each time he decreased dose he had flare in joints lasting several days that were worse than the original. Currently notes the pain is still present, but more of dull ache in joints with tightness in hamstrings, and feeling as though he has shin splints. Had also been taking PRN ibuprofen 800 mg. Combination NSAID and prednisone helped greatly.     +dacylitis (L foot 4th toe), inflammatory back pain   - enthesitis, iritis/uveitis, personal or fam hx psoriasis or IBD             Review of Systems:   Complete ROS negative except for symptoms mentioned in the HPI     No h/o unintentional weight loss, loss of appetite, swollen glands +prior fevers, arthralgias as above. + rash on L lateral arm while in [place] that resolved with topical steroid  No h/o gout  No family or personal history of psoriasis, UC or crohn's disease. " No h/o uveitis/iritis. No enthesitis, plantar fascitis.   Patient denies any raynauds, malar rash, photosensitivity, recurrent mouth/genital ulcers, sicca symptoms, recurrent sinusitis/rhinitis  No h/o recurrent arterial/venous thrombosis in the past  No h/o persistent shortness of breath, cough, chest pain  No h/o persistent nausea, vomiting, constipation, diarrhea, abdominal pain  No h/o hematochezia, hematuria, hemoptysis, hematemesis + bloody stool during episode of diarrhea (per pt this was from hemorrhoids)  No h/o seizures or strokes  No h/o cancer  No h/o shingles         Past Medical History:   syphillis - s/p treatment years ago  Prior wrist and ankle sprains         Social History:     Social History     Occupational History     Not on file.     Social History Main Topics     Smoking status: Never Smoker     Smokeless tobacco: Not on file     Alcohol use Yes     Drug use: No     Sexual activity: Not on file   sexually active  Used to work as dancer/singer, now owns a [employer] business  Works from home  Nonsmoker  Drinks 3-5 drinks 3x per week         Family History:   No family history on file.   Cousin with RA  No fam hx of any autoimmune/rheumatological conditions including SLE, psoriasis, IBD, MS         Allergies:     Allergies   Allergen Reactions     Amoxicillin GI Disturbance            Medications:     Current Outpatient Prescriptions   Medication Sig Dispense Refill     Emtricitabine-Tenofovir DF (TRUVADA) 100-150 MG TABS        IBUPROFEN  mg       HYDROcodone-acetaminophen (NORCO) 7.5-325 MG per tablet        traMADol (ULTRAM) 50 MG tablet Take 1 tablet (50 mg) by mouth every 12 hours as needed for pain Take 1 tablet as needed for pain.  No driving or alcohol use while taking medication. 60 tablet 1     predniSONE (DELTASONE) 5 MG tablet 20 mg x2 wk, 15 mg x2 wk, 10 mg daily until see us next 100 tablet 1     ranitidine (ZANTAC) 150 MG tablet Take 1 tablet (150 mg) by mouth 2 times  "daily (Patient not taking: Reported on 3/13/2017) 60 tablet 0     oxyCODONE (ROXICODONE) 5 MG IR tablet Take 1-2 tablets (5-10 mg) by mouth every 6 hours as needed for pain maximum 6 tablet(s) per day (Patient not taking: Reported on 3/13/2017) 20 tablet 0            Physical Exam:   Blood pressure 132/89, pulse 87, height 1.727 m (5' 8\"), weight 73.5 kg (162 lb), SpO2 97 %.  Wt Readings from Last 4 Encounters:   03/13/17 73.5 kg (162 lb)   02/25/17 72.6 kg (160 lb)     BP Readings from Last 3 Encounters:   03/13/17 132/89   02/25/17 (!) 133/98       Constitutional: well-developed, appearing stated age, cooperative  Eyes: PERRLA, normal conjunctiva, sclera  ENT: nl external ears, nose, lips. No mucous membrane lesions, normal saliva pool  Neck: no cervical or supraclavicular lymphadenopathy, no parotid swelling, normal palpable thyroid  Resp: CTAB, no wheezing, breathing comfortably on room air  CV: RRR, no m/r/g, no LE edema  GI: soft, NT/ND, +BS, no HSM  : not tested  Lymph: no cervical, supraclavicular or epitrochlear nodes  MS: All joints including shoulder, elbow, wrist, MCP/PIP/DIP, hip, knee, ankle, and foot MTP/PIP/DIP joints examined and found normal w/o synovitis or deformity other than that listed below:   - L wrist warm, tender, swollen but with FROM  - L MCPs all slightly swollen and mildly tender without warmth  - slightly decreased fist formation left hand and decreased  strength  - BL knees warm, tender along joint lines, swollen with FROM  - BL ankles with trace effusion, slightly warm and tender on palpation   - all MTPs slightly tender without swelling  - L foot 2nd digit dactylitis appearance with significant swelling/redness of PIP joint  No enthesopathy.  Skin: R hand 3rd digit possible nail pitting; no nodules, no rash in exposed areas  Psych: nl judgement, orientation, memory, affect.  Neuro: CN II-XII grossly intact, moving all extremities equally, normal gait. Strength BL deltoid 5/5, " BL tricep 5/5, BL bicep 5/5, BL hip flexors 5/5, BL quads 5/5, BL hamstrings 5/5         Data:     Results for orders placed or performed in visit on 03/13/17   Hepatitis B Surface Antibody   Result Value Ref Range    Hepatitis B Surface Antibody 9.81 (H) <8.00 m[IU]/mL     Lab Results   Component Value Date    WBC 12.3 02/25/2017     Lab Results   Component Value Date    RBC 5.03 02/25/2017     Lab Results   Component Value Date    HGB 14.8 02/25/2017     Lab Results   Component Value Date    HCT 43.6 02/25/2017     No components found for: MCT  Lab Results   Component Value Date    MCV 87 02/25/2017     Lab Results   Component Value Date    MCH 29.4 02/25/2017     Lab Results   Component Value Date    MCHC 33.9 02/25/2017     Lab Results   Component Value Date    RDW 13.7 02/25/2017     Lab Results   Component Value Date     02/25/2017     Lab Results   Component Value Date    AST 12 02/25/2017     Lab Results   Component Value Date    ALT 22 02/25/2017     No results found for: BILICONJ   Lab Results   Component Value Date    BILITOTAL 0.5 02/25/2017     Lab Results   Component Value Date    ALBUMIN 3.7 02/25/2017     Lab Results   Component Value Date    PROTTOTAL 8.1 02/25/2017      Lab Results   Component Value Date    ALKPHOS 106 02/25/2017     Last Basic Metabolic Panel:  Lab Results   Component Value Date     02/25/2017      Lab Results   Component Value Date    POTASSIUM 3.6 02/25/2017     Lab Results   Component Value Date    CHLORIDE 106 02/25/2017     Lab Results   Component Value Date    TALAT 9.3 02/25/2017     Lab Results   Component Value Date    CO2 24 02/25/2017     Lab Results   Component Value Date    BUN 8 02/25/2017     Lab Results   Component Value Date    CR 0.86 02/25/2017     Lab Results   Component Value Date     02/25/2017     CRP 73  ESR 22  Urinalysis normal    ID workup:  Chlamydia rectal swab +  Chlamydia urine test and oral swab neg  Gonorrhea rectal, urine, oral  tests neg  Anti TPA positive, but RPR neg, confirmatory TP-PA pos     R knee XR - moderate effusion, no erosions    Leslie Coombs MD    Reviewed all relevant data including labs and imaging.     Again, thank you for allowing me to participate in the care of your patient.      Sincerely,    Leslie Coombs MD

## 2017-03-13 NOTE — MR AVS SNAPSHOT
After Visit Summary   3/13/2017    Edgar Simms    MRN: [id number]           Patient Information     Date Of Birth          1975        Visit Information        Provider Department      3/13/2017 9:30 AM Leslie Coombs MD [facility]        Today's Diagnoses     Johann's disease of knee, unspecified laterality (H)    -  1    Inflammatory arthritis          Care Instructions    You likely have reactive arthritis    Take prednisone course as prescribed     Labs today     Could use tramadol as needed    Return in 4-8 weeks with me        Follow-ups after your visit        Follow-up notes from your care team     Return in about 6 weeks (around 4/24/2017) for 4-8 weeks.      Your next 10 appointments already scheduled     May 08, 2017  9:30 AM CDT   (Arrive by 9:15 AM)   Return Visit with Leslie Coombs MD   [facility] ([facility])    [address] [place] [id number] [phone]              Future tests that were ordered for you today     Open Future Orders        Priority Expected Expires Ordered    Uric acid Routine  3/13/2018 3/13/2017    ESR Routine  3/13/2018 3/13/2017    CRP inflammation Routine  3/13/2018 3/13/2017    HLA-B27 Typing Routine  3/13/2018 3/13/2017    Lyme Disease Patricia with reflex to WB Serum Routine  3/13/2018 3/13/2017    Hepatitis B surface antigen Routine  3/13/2018 3/13/2017    Hepatitis B core antibody Routine  3/13/2018 3/13/2017    Hepatitis C antibody Routine  3/13/2018 3/13/2017            Who to contact     If you have questions or need follow up information about today's clinic visit or your schedule please contact [facility] RHEUMATOLOGY directly at [phone].  Normal or non-critical lab and imaging results will be communicated to you by MyChart, letter or phone within 4 business days after the clinic has received the results. If you do not hear from us within 7 days, please  "contact the clinic through [employer] or phone. If you have a critical or abnormal lab result, we will notify you by phone as soon as possible.  Submit refill requests through [employer] or call your pharmacy and they will forward the refill request to us. Please allow 3 business days for your refill to be completed.          Additional Information About Your Visit        [employer] Information     [employer] gives you secure access to your electronic health record. If you see a primary care provider, you can also send messages to your care team and make appointments. If you have questions, please call your primary care clinic.  If you do not have a primary care provider, please call [phone] and they will assist you.        Care EveryWhere ID     This is your Care EveryWhere ID. This could be used by other organizations to access your [place] medical records  [id number]        Your Vitals Were     Pulse Height Pulse Oximetry BMI (Body Mass Index)          87 1.727 m (5' 8\") 97% 24.63 kg/m2         Blood Pressure from Last 3 Encounters:   03/13/17 132/89   02/25/17 (!) 133/98    Weight from Last 3 Encounters:   03/13/17 73.5 kg (162 lb)   02/25/17 72.6 kg (160 lb)              We Performed the Following     Hepatitis B Surface Antibody          Today's Medication Changes          These changes are accurate as of: 3/13/17 11:33 AM.  If you have any questions, ask your nurse or doctor.               Start taking these medicines.        Dose/Directions    predniSONE 5 MG tablet   Commonly known as:  DELTASONE   Used for:  Inflammatory arthritis   Started by:  Leslie Coombs MD        20 mg x2 wk, 15 mg x2 wk, 10 mg daily until see us next   Quantity:  100 tablet   Refills:  1       traMADol 50 MG tablet   Commonly known as:  ULTRAM   Used for:  Johann's disease of knee, unspecified laterality (H), Inflammatory arthritis   Started by:  Leslie Coombs MD        Dose:  50 mg   Take 1 tablet (50 mg) by mouth " every 12 hours as needed for pain Take 1 tablet as needed for pain.  No driving or alcohol use while taking medication.   Quantity:  60 tablet   Refills:  1            Where to get your medicines      These medications were sent to [facility] - [place], [place] - [address]  [address], [place] [address]     Phone:  [phone]     predniSONE 5 MG tablet         Some of these will need a paper prescription and others can be bought over the counter.  Ask your nurse if you have questions.     Bring a paper prescription for each of these medications     traMADol 50 MG tablet                Primary Care Provider Office Phone # Fax #    Tee Duque [phone]       [place] [id number] [address] [address]        Thank you!     Thank you for choosing [facility]  for your care. Our goal is always to provide you with excellent care. Hearing back from our patients is one way we can continue to improve our services. Please take a few minutes to complete the written survey that you may receive in the mail after your visit with us. Thank you!             Your Updated Medication List - Protect others around you: Learn how to safely use, store and throw away your medicines at www.disposemymeds.org.          This list is accurate as of: 3/13/17 11:33 AM.  Always use your most recent med list.                   Brand Name Dispense Instructions for use    HYDROcodone-acetaminophen 7.5-325 MG per tablet    NORCO         IBUPROFEN PO      800 mg       oxyCODONE 5 MG IR tablet    ROXICODONE    20 tablet    Take 1-2 tablets (5-10 mg) by mouth every 6 hours as needed for pain maximum 6 tablet(s) per day       predniSONE 5 MG tablet    DELTASONE    100 tablet    20 mg x2 wk, 15 mg x2 wk, 10 mg daily until see us next       ranitidine 150 MG tablet    ZANTAC    60 tablet    Take 1 tablet (150 mg) by mouth 2 times daily       traMADol 50 MG tablet     ULTRAM    60 tablet    Take 1 tablet (50 mg) by mouth every 12 hours as needed for pain Take 1 tablet as needed for pain.  No driving or alcohol use while taking medication.       TRUVADA 100-150 MG Tabs   Generic drug:  Emtricitabine-Tenofovir DF

## 2017-03-14 LAB — HLA-B27 QL NAA+PROBE: NORMAL

## 2017-03-14 NOTE — PROGRESS NOTES
[facility] - Rheumatology Clinic Visit     Edgar Simms MRN# [id number]   YOB: 1975    Primary care provider: Tee Duque  Mar 13, 2017          Assessment and Plan:     Problem list:  # reactive arthritis w/ + chlamydia trachomatis rectal swab 2/25/17, polyarticular inflammatory arthritis involving BL knees, L hand/wrist, BL ankle, BL MTPs, L foot 2nd digit (?dactylitis), inflammatory back pain occurring 2 wks after URI//GI illness w/ associated conjunctivitis  # recent chlamydia trochomatis infection, s/p azithromycin 1g (appropriately txd)  # hx syphillis years ago, s/p treatment. 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 Workup in ED on 2/25 showing +chlamydia rectal swab consistent w/ recent chlamydia infection. Despite having prior axial pain/stiffness symptoms and possibly prior dactylitis of toe, differential dx less likely other spondyloarthropathies such as PsA or AS or a crystalline arthropathy, but these have to remain on differential. Also much less likely RA (RF/CCP neg). For this, would suggest some additional workup as listed below.     Most Hilda resolves within 2-5 months, if progresses beyond 6 months, still most commonly resolves by 1 year. Few go on to develop chronic arthritis, RF includes HLAB27 presence. Will send for this today. Treatment typically NSAIDs or steroids. Given degree of inflammation will elect steroids with slow taper. May need DMARD (SSZ) in future for steroid-sparing agent if symptoms continue.     Plan:  - prednisone burst/taper - 20 mg x2 wk, 15 mg x2 wk, 10 mg daily until next appt  - would avoid concomitant NSAIDs while on prednisone  - will consider SSZ use in  "future if needed  - trial tramadol in place of norco/oxycodone  - tests as below    Routine health maintenance:  HBVsAg: today   HBVcore: today   HCV: today   Q gold (or T spot): not checked  Vitamin D and calcium/bone health: recommended starting Ca/Vit D supplement while on prednisone    I will be back in touch with the patient through mychart/letter when results are available.     Return in about 6 weeks (around 4/24/2017) for 4-8 weeks.    Orders Placed This Encounter   Procedures     Uric acid     ESR     CRP inflammation     HLA-B27 Typing     Lyme Disease Patricia with reflex to WB Serum     Hepatitis B surface antigen     Hepatitis B core antibody     Hepatitis B Surface Antibody     Hepatitis C antibody       Patient was seen and discussed with Dr. Singh.     Leslie Coombs MD  Rheumatology Fellow  Pager [phone]    Attending Note: I saw and evaluated the patient with Dr. Coombs. I agree with the assessment and plan.    Nina Singh MD            Active Problem List:   Inflammatory arthritis         History of Present Illness:     Chief Complaint   Patient presents with     RECHECK     knee pain       Patient is a 42 y/o M who presents today for rheumatology referral from Dr. Eliseo Mathew (ED) for possible reactive arthritis.     Pt travelled to [place] for vacation for 2 weeks starting Jan 25th. States toward the end of the trip he developed a cold with URI symptoms, then 2d later developed severe illness with fever (soaking sheets), head congestion, bloody diarrhea. Felt so sick didn't leave the hotel x2 weeks. States was the \"worst cold\" he's ever had. After returning to [place], he was seen by PCP in [facility] clinic where he was also diagnosed with \"pink eye\" originally affecting one eye, then the other. Caused some mild eye redness, pain, no significant changes in vision with symptoms resolving within a few days. Noted around this time he developed significant pain/swelling/stiffness in multiple joints, " primarily in areas where he believes he's previously injured. Noted pain in R lateral knee pain, L wrist, and L ankle along with L foot 2nd digit swelling all within 24 hours. Noted pain was excruciating and he was unable to get out of bed 2/2 the pain.     He was seen in ED on 2/25 and after discussion with rheumatology and ID on the phone was diagnosed with likely reactive arthritis. Of note the pt does report having several sexual encounters while on vacation. ID workup in ED revealed +chlamydia rectal swab, but oral, urinary chlamydia tests negative. Rest of workup with gonorrhea swabs, UA, CXR was negative. Had elevated ESR/CRP. Pos syphillis test, but RPR was negative, which is consistent with his prior tx for syphillis. Pt was started on prednisone 20 mg x5d, 15 mg x5d, 10 mg x5d. Noted this helped significantly, but each time he decreased dose he had flare in joints lasting several days that were worse than the original. Currently notes the pain is still present, but more of dull ache in joints with tightness in hamstrings, and feeling as though he has shin splints. Had also been taking PRN ibuprofen 800 mg. Combination NSAID and prednisone helped greatly.     +dacylitis (L foot 4th toe), inflammatory back pain   - enthesitis, iritis/uveitis, personal or fam hx psoriasis or IBD             Review of Systems:   Complete ROS negative except for symptoms mentioned in the HPI     No h/o unintentional weight loss, loss of appetite, swollen glands +prior fevers, arthralgias as above. + rash on L lateral arm while in [place] that resolved with topical steroid  No h/o gout  No family or personal history of psoriasis, UC or crohn's disease. No h/o uveitis/iritis. No enthesitis, plantar fascitis.   Patient denies any raynauds, malar rash, photosensitivity, recurrent mouth/genital ulcers, sicca symptoms, recurrent sinusitis/rhinitis  No h/o recurrent arterial/venous thrombosis in the past  No h/o persistent shortness of  breath, cough, chest pain  No h/o persistent nausea, vomiting, constipation, diarrhea, abdominal pain  No h/o hematochezia, hematuria, hemoptysis, hematemesis + bloody stool during episode of diarrhea (per pt this was from hemorrhoids)  No h/o seizures or strokes  No h/o cancer  No h/o shingles         Past Medical History:   syphillis - s/p treatment years ago  Prior wrist and ankle sprains         Social History:     Social History     Occupational History     Not on file.     Social History Main Topics     Smoking status: Never Smoker     Smokeless tobacco: Not on file     Alcohol use Yes     Drug use: No     Sexual activity: Not on file   sexually active  Used to work as dancer/singer, now owns a [employer] business  Works from home  Nonsmoker  Drinks 3-5 drinks 3x per week         Family History:   No family history on file.   Cousin with RA  No fam hx of any autoimmune/rheumatological conditions including SLE, psoriasis, IBD, MS         Allergies:     Allergies   Allergen Reactions     Amoxicillin GI Disturbance            Medications:     Current Outpatient Prescriptions   Medication Sig Dispense Refill     Emtricitabine-Tenofovir DF (TRUVADA) 100-150 MG TABS        IBUPROFEN  mg       HYDROcodone-acetaminophen (NORCO) 7.5-325 MG per tablet        traMADol (ULTRAM) 50 MG tablet Take 1 tablet (50 mg) by mouth every 12 hours as needed for pain Take 1 tablet as needed for pain.  No driving or alcohol use while taking medication. 60 tablet 1     predniSONE (DELTASONE) 5 MG tablet 20 mg x2 wk, 15 mg x2 wk, 10 mg daily until see us next 100 tablet 1     ranitidine (ZANTAC) 150 MG tablet Take 1 tablet (150 mg) by mouth 2 times daily (Patient not taking: Reported on 3/13/2017) 60 tablet 0     oxyCODONE (ROXICODONE) 5 MG IR tablet Take 1-2 tablets (5-10 mg) by mouth every 6 hours as needed for pain maximum 6 tablet(s) per day (Patient not taking: Reported on 3/13/2017) 20 tablet 0            Physical Exam:  "  Blood pressure 132/89, pulse 87, height 1.727 m (5' 8\"), weight 73.5 kg (162 lb), SpO2 97 %.  Wt Readings from Last 4 Encounters:   03/13/17 73.5 kg (162 lb)   02/25/17 72.6 kg (160 lb)     BP Readings from Last 3 Encounters:   03/13/17 132/89   02/25/17 (!) 133/98       Constitutional: well-developed, appearing stated age, cooperative  Eyes: PERRLA, normal conjunctiva, sclera  ENT: nl external ears, nose, lips. No mucous membrane lesions, normal saliva pool  Neck: no cervical or supraclavicular lymphadenopathy, no parotid swelling, normal palpable thyroid  Resp: CTAB, no wheezing, breathing comfortably on room air  CV: RRR, no m/r/g, no LE edema  GI: soft, NT/ND, +BS, no HSM  : not tested  Lymph: no cervical, supraclavicular or epitrochlear nodes  MS: All joints including shoulder, elbow, wrist, MCP/PIP/DIP, hip, knee, ankle, and foot MTP/PIP/DIP joints examined and found normal w/o synovitis or deformity other than that listed below:   - L wrist warm, tender, swollen but with FROM  - L MCPs all slightly swollen and mildly tender without warmth  - slightly decreased fist formation left hand and decreased  strength  - BL knees warm, tender along joint lines, swollen with FROM  - BL ankles with trace effusion, slightly warm and tender on palpation   - all MTPs slightly tender without swelling  - L foot 2nd digit dactylitis appearance with significant swelling/redness of PIP joint  No enthesopathy.  Skin: R hand 3rd digit possible nail pitting; no nodules, no rash in exposed areas  Psych: nl judgement, orientation, memory, affect.  Neuro: CN II-XII grossly intact, moving all extremities equally, normal gait. Strength BL deltoid 5/5, BL tricep 5/5, BL bicep 5/5, BL hip flexors 5/5, BL quads 5/5, BL hamstrings 5/5         Data:     Results for orders placed or performed in visit on 03/13/17   Hepatitis B Surface Antibody   Result Value Ref Range    Hepatitis B Surface Antibody 9.81 (H) <8.00 m[IU]/mL     Lab " Results   Component Value Date    WBC 12.3 02/25/2017     Lab Results   Component Value Date    RBC 5.03 02/25/2017     Lab Results   Component Value Date    HGB 14.8 02/25/2017     Lab Results   Component Value Date    HCT 43.6 02/25/2017     No components found for: MCT  Lab Results   Component Value Date    MCV 87 02/25/2017     Lab Results   Component Value Date    MCH 29.4 02/25/2017     Lab Results   Component Value Date    MCHC 33.9 02/25/2017     Lab Results   Component Value Date    RDW 13.7 02/25/2017     Lab Results   Component Value Date     02/25/2017     Lab Results   Component Value Date    AST 12 02/25/2017     Lab Results   Component Value Date    ALT 22 02/25/2017     No results found for: BILICONJ   Lab Results   Component Value Date    BILITOTAL 0.5 02/25/2017     Lab Results   Component Value Date    ALBUMIN 3.7 02/25/2017     Lab Results   Component Value Date    PROTTOTAL 8.1 02/25/2017      Lab Results   Component Value Date    ALKPHOS 106 02/25/2017     Last Basic Metabolic Panel:  Lab Results   Component Value Date     02/25/2017      Lab Results   Component Value Date    POTASSIUM 3.6 02/25/2017     Lab Results   Component Value Date    CHLORIDE 106 02/25/2017     Lab Results   Component Value Date    TALAT 9.3 02/25/2017     Lab Results   Component Value Date    CO2 24 02/25/2017     Lab Results   Component Value Date    BUN 8 02/25/2017     Lab Results   Component Value Date    CR 0.86 02/25/2017     Lab Results   Component Value Date     02/25/2017     CRP 73  ESR 22  Urinalysis normal    ID workup:  Chlamydia rectal swab +  Chlamydia urine test and oral swab neg  Gonorrhea rectal, urine, oral tests neg  Anti TPA positive, but RPR neg, confirmatory TP-PA pos     R knee XR - moderate effusion, no erosions    Leslie Coombs MD    Reviewed all relevant data including labs and imaging.

## 2017-03-16 LAB
B LOCUS: NORMAL
B27TEST METHOD: NORMAL

## 2017-03-20 ENCOUNTER — TELEPHONE (OUTPATIENT)
Dept: RHEUMATOLOGY | Facility: CLINIC | Age: 42
End: 2017-03-20

## 2017-03-20 NOTE — TELEPHONE ENCOUNTER
"----- Message from Leslie Coombs MD sent at 3/20/2017 12:35 PM CDT -----  Regarding: results  Can you kindly call Mr. Simms and let him know the following regarding blood tests from last week -     - markers of inflammation are both high consistent with active inflammation, this is not unexpected  - screening for hepatitis B and hepatitis C was negative/normal (this was done in case we need to use certain medications in the future)  - screening for the presence of a particular gene called \"HLA-B27\" was positive - this tells us you had some predisposition to developing the reactive arthritis (about 30-50% of people with reactive arthritis have this genetic allele). The presence of the HLA-B27 gene is also a risk factor for the reactive arthritis to be more severe (which yours is) and slightly increases your risk of this becoming a chronic problem  - ultimately none of the above changes our current plan. We can continue with steroids and at the next visit decide whether to start the sulfasalazine     Thanks  Ladan  "

## 2017-03-20 NOTE — TELEPHONE ENCOUNTER
Called and spoke with pt regarding Dr. Coombs's message.  Pt had no further questions, discussed that prednisone does appear to be working.  He states that the good days are better, and that the bad days are not so bad anymore.  Discussed that he is feeling that things are headed in the right direction.    Pt will call if needs anything else.    Marcy Egan RN  Rheumatology Clinic

## 2019-11-05 ENCOUNTER — HEALTH MAINTENANCE LETTER (OUTPATIENT)
Age: 44
End: 2019-11-05

## 2020-11-22 ENCOUNTER — HEALTH MAINTENANCE LETTER (OUTPATIENT)
Age: 45
End: 2020-11-22

## 2021-09-19 ENCOUNTER — HEALTH MAINTENANCE LETTER (OUTPATIENT)
Age: 46
End: 2021-09-19

## 2021-12-22 ENCOUNTER — HOSPITAL ENCOUNTER (EMERGENCY)
Facility: CLINIC | Age: 46
Discharge: LEFT BEFORE DISCHARGE | End: 2021-12-22
Admitting: EMERGENCY MEDICINE
Payer: COMMERCIAL

## 2021-12-22 VITALS
WEIGHT: 150 LBS | RESPIRATION RATE: 16 BRPM | SYSTOLIC BLOOD PRESSURE: 149 MMHG | TEMPERATURE: 98.5 F | OXYGEN SATURATION: 95 % | DIASTOLIC BLOOD PRESSURE: 77 MMHG | HEART RATE: 108 BPM | BODY MASS INDEX: 22.81 KG/M2

## 2021-12-22 DIAGNOSIS — R45.1 AGITATION: ICD-10-CM

## 2021-12-22 PROCEDURE — 999N000104 HC STATISTIC NO CHARGE

## 2021-12-22 NOTE — ED NOTES
Patient attempting to leave ED. Security at door with patient. Patient seen by Dr. Lemons who determined patient could leave.

## 2021-12-22 NOTE — ED NOTES
Patient came out of room, stating he wanted to leave. Patient verbally loud, and walked toward ED exit doors.  MD notified, and talked with patient. Patient did not to be seen and MD allowed patient to leave. Patient left ED ambulatory and with his belongings.

## 2021-12-22 NOTE — ED PROVIDER NOTES
"ED Provider Note  [facility]      History     Chief Complaint   Patient presents with     Addiction Problem     using meth \" per IV\"     Paranoid     pt thinks he had stroke or aneurysm, has brain swelling     HPI  Edgar Simms is a 46 year old male who presents for mental health evaluation.  He states that his neighbors are harassing him so he called the police, they brought him here for evaluation.  He states that he knows that this is an psychosis, he knows that there really harassing him.  He states that he had a panic attack when the police were there at his apartment, which is why they brought him in.  There has been report of meth use.  I was called urgently to see the patient as he was standing at the front door to the ER, surrounded by security.  He is demanding to be released immediately.  He denies suicidal or homicidal ideation.  He states that he is having a panic attack, absolutely does not wish to be seen, wishes to discharge at this time.  Although he may have substances in his system, he has not been drinking tonight, his mentation does not seem impaired.  He is not suicidal or homicidal.  He is demanding immediate discharge, and I do not think he is holdable at this time.  He is encouraged to return if he changes his mind.  We did release him.    Past Medical History  No past medical history on file.  No past surgical history on file.  Emtricitabine-Tenofovir DF (TRUVADA) 100-150 MG TABS  HYDROcodone-acetaminophen (NORCO) 7.5-325 MG per tablet  IBUPROFEN PO  oxyCODONE (ROXICODONE) 5 MG IR tablet  predniSONE (DELTASONE) 5 MG tablet  traMADol (ULTRAM) 50 MG tablet      Allergies   Allergen Reactions     Amoxicillin GI Disturbance     Family History  No family history on file.  Social History   Social History     Tobacco Use     Smoking status: Never Smoker     Smokeless tobacco: Not on file   Substance Use Topics     Alcohol use: Yes     Drug use: No      Past medical " history, past surgical history, medications, allergies, family history, and social history were reviewed with the patient. No additional pertinent items.       Review of Systems  patient refused - agitated    Physical Exam   BP: (!) 149/77  Pulse: 108  Temp: 98.5  F (36.9  C)  Resp: 16  Weight: 68 kg (150 lb)  SpO2: 95 %  Physical Exam  Constitutional:       General: He is in acute distress (agitated).   HENT:      Head: Normocephalic and atraumatic.   Eyes:      General: No scleral icterus.  Pulmonary:      Effort: No respiratory distress.   Musculoskeletal:         General: No deformity.   Psychiatric:      Comments: agitated         ED Course      Procedures                     No results found for any visits on 12/22/21.  Medications - No data to display     Assessments & Plan (with Medical Decision Making)   Please see HPI, the patient was released from the ED at his request prior to complete evaluation.    I have reviewed the nursing notes. I have reviewed the findings, diagnosis, plan and need for follow up with the patient.    New Prescriptions    No medications on file       Final diagnoses:   Agitation       --    [facility] EMERGENCY DEPARTMENT  12/22/2021     Nani Lemons MD  12/22/21 [id number]

## 2022-01-09 ENCOUNTER — HEALTH MAINTENANCE LETTER (OUTPATIENT)
Age: 47
End: 2022-01-09

## 2022-11-20 ENCOUNTER — HEALTH MAINTENANCE LETTER (OUTPATIENT)
Age: 47
End: 2022-11-20

## 2023-04-15 ENCOUNTER — HEALTH MAINTENANCE LETTER (OUTPATIENT)
Age: 48
End: 2023-04-15

## 2024-06-22 ENCOUNTER — HEALTH MAINTENANCE LETTER (OUTPATIENT)
Age: 49
End: 2024-06-22